# Patient Record
Sex: MALE | Race: BLACK OR AFRICAN AMERICAN | NOT HISPANIC OR LATINO | URBAN - METROPOLITAN AREA
[De-identification: names, ages, dates, MRNs, and addresses within clinical notes are randomized per-mention and may not be internally consistent; named-entity substitution may affect disease eponyms.]

---

## 2024-09-22 ENCOUNTER — EMERGENCY (EMERGENCY)
Facility: HOSPITAL | Age: 34
LOS: 1 days | Discharge: HOME | End: 2024-09-22
Attending: EMERGENCY MEDICINE | Admitting: EMERGENCY MEDICINE
Payer: MEDICAID

## 2024-09-22 VITALS
RESPIRATION RATE: 16 BRPM | DIASTOLIC BLOOD PRESSURE: 69 MMHG | HEART RATE: 78 BPM | OXYGEN SATURATION: 97 % | SYSTOLIC BLOOD PRESSURE: 119 MMHG | TEMPERATURE: 98 F

## 2024-09-22 VITALS
OXYGEN SATURATION: 98 % | RESPIRATION RATE: 16 BRPM | HEART RATE: 98 BPM | SYSTOLIC BLOOD PRESSURE: 98 MMHG | TEMPERATURE: 98 F | DIASTOLIC BLOOD PRESSURE: 61 MMHG

## 2024-09-22 DIAGNOSIS — R41.82 ALTERED MENTAL STATUS, UNSPECIFIED: ICD-10-CM

## 2024-09-22 PROCEDURE — 99283 EMERGENCY DEPT VISIT LOW MDM: CPT | Mod: 25

## 2024-09-22 NOTE — ED ADULT NURSE NOTE - OBJECTIVE STATEMENT
31 y/o male [place] patient has notable pinpoint pupils saturating at 96% on RA. breathing is even and unlabored RR 14. patient in no visible distress. pending reassessment and sobriety.

## 2024-09-22 NOTE — ED PROVIDER NOTE - PATIENT PORTAL LINK FT
You can access the FollowMyHealth Patient Portal offered by [facility] by registering at the following website: [email or web address]. By joining [employer]’s FollowMyHealth portal, you will also be able to view your health information using other applications (apps) compatible with our system.

## 2024-09-22 NOTE — ED PROVIDER NOTE - OBJECTIVE STATEMENT
Approximately late 20s male [place] and minimally responsive.  There were no signs of trauma.  He has pinpoint pupils and is well-kempt.

## 2024-09-22 NOTE — ED PROVIDER NOTE - PHYSICAL EXAMINATION
Const: Obtunded, good hygiene.  ENT: Airway patent, protecting airway. Nasal mucosa clear. MMM. No scalp hematoma and no apparent c-spine tenderness.  Eyes: Clear bilaterally, pupils equal, round 1mm  Cardiac: Normal rate, regular rhythm.  Heart sounds S1, S2.  No murmurs, rubs or gallops.  Resp: Breath sounds clear and equal bilaterally.  GI: Abdomen soft, appears non-tender, no guarding.  MSK: No signs of acute trauma or injury.   Neuro: Obtunded, VILLATORO, normal tone, moans if stimulated  Skin: No signs of acute trauma or injury.   Psyc: Obtunded

## 2024-09-22 NOTE — ED PROVIDER NOTE - CLINICAL SUMMARY MEDICAL DECISION MAKING FREE TEXT BOX
Young man, identity unknown [place] with a presumed intoxication.  His symptoms most strongly resemble an opioid intoxication.  Particularly given the pinpoint pupils in a dark room.  He appears to be breathing adequately.  Will place on pulse oximetry and discharge when clinically sober.  Will provide with outpatient substance use resources. Young man, identity unknown [place] with a presumed intoxication.  His symptoms most strongly resemble an opioid intoxication.  Particularly given the pinpoint pupils in a dark room.  He appears to be breathing adequately.  Will place on pulse oximetry and discharge when clinically sober.  Will provide with outpatient substance use resources.    @ 940am pt is alert with fluent and appropriate speech states his name is Nazario.  states he drank alcohol   ED evaluation and management discussed with the patient and family (if available) in detail.  Close PMD follow up encouraged.  Strict ED return instructions discussed in detail and patient given the opportunity to ask any questions about their discharge diagnosis and instructions. Patient verbalized understanding.

## 2024-09-22 NOTE — ED PROVIDER NOTE - NSFOLLOWUPINSTRUCTIONS_ED_ALL_ED_FT
Please get help for alcohol abuse if you drink heavily or use drugs on a regular basis.     [facility] is a good referral line for crisis and substance abuse help.  [facility] has drop in programs all over the [facility].    Return to the ER for Emergencies.     [facility]: [phone]   [facility] Substance Abuse Services: [phone], option #2   Methadone Maintenance & Ambulatory Opiate Detox: [phone]  [facility]: [phone]  [facility]: [phone]  [facility]: [phone]    [facility]: [phone], option #2   [facility]: [phone]    [facility]: [phone]    [facility] Central Intake: [phone]  [facility] Chemical Dependency/Ancillary Withdrawal: [phone]  [facility] Methadone Maintenance: [phone]    [facility]: [phone]  [facility] Services: [phone]    [facility]: 3-883-7-[facility] Office of Alcoholism and Substance Abuse Services (OASAS): https://www.oasas.[place].gov/providerdirectory/  [facility] for Addiction Services and Psychotherapy Interventions Research (CASPIR)  www.[facility].org     Interested in discussing options to reduce your tobacco use?    [facility] for Tobacco Control:  [phone]  [facility] QUITLINE: 2-000-MG-QUITS (103-5856)    Interested in learning more about substance use?      http://rethinkingdrinking.niaaa.nih.gov   https://www.drugabuse.gov/patients-families     Learn more about opioid overdose prevention programs in [place]:  http://www.health.[place].gov/diseases/aids/general/opioid_overdose_prevention/